# Patient Record
Sex: FEMALE | Race: BLACK OR AFRICAN AMERICAN
[De-identification: names, ages, dates, MRNs, and addresses within clinical notes are randomized per-mention and may not be internally consistent; named-entity substitution may affect disease eponyms.]

---

## 2020-08-20 LAB
ALBUMIN SERPL-MCNC: 3.3 G/DL (ref 3.5–5)
ALP SERPL-CCNC: 88 U/L (ref 38–126)
ANION GAP SERPL CALC-SCNC: 11 MMOL/L (ref 5–19)
AST SERPL-CCNC: 25 U/L (ref 14–36)
BILIRUB DIRECT SERPL-MCNC: 0.1 MG/DL (ref 0–0.4)
BILIRUB SERPL-MCNC: 0.7 MG/DL (ref 0.2–1.3)
BUN SERPL-MCNC: 9 MG/DL (ref 7–20)
CALCIUM: 9.2 MG/DL (ref 8.4–10.2)
CHLORIDE SERPL-SCNC: 98 MMOL/L (ref 98–107)
CO2 SERPL-SCNC: 27 MMOL/L (ref 22–30)
ERYTHROCYTE [DISTWIDTH] IN BLOOD BY AUTOMATED COUNT: 13.2 % (ref 11.5–14)
GLUCOSE SERPL-MCNC: 84 MG/DL (ref 75–110)
HCT VFR BLD CALC: 43.2 % (ref 36–47)
HGB BLD-MCNC: 14.8 G/DL (ref 12–15.5)
MCH RBC QN AUTO: 33.3 PG (ref 27–33.4)
MCHC RBC AUTO-ENTMCNC: 34.2 G/DL (ref 32–36)
MCV RBC AUTO: 98 FL (ref 80–97)
PLATELET # BLD: 482 10^3/UL (ref 150–450)
POTASSIUM SERPL-SCNC: 3.6 MMOL/L (ref 3.6–5)
PROT SERPL-MCNC: 6.7 G/DL (ref 6.3–8.2)
RBC # BLD AUTO: 4.43 10^6/UL (ref 3.72–5.28)
WBC # BLD AUTO: 10.6 10^3/UL (ref 4–10.5)

## 2020-08-20 NOTE — RADIOLOGY REPORT (SQ)
EXAM DESCRIPTION:  CHEST PA/LATERAL



IMAGES COMPLETED DATE/TIME:  8/20/2020 12:53 pm



REASON FOR STUDY:  COUGH



COMPARISON:  2/13/2015



EXAM PARAMETERS:  NUMBER OF VIEWS: two views

TECHNIQUE: Digital Frontal and Lateral radiographic views of the chest acquired.

RADIATION DOSE: NA

LIMITATIONS: none



FINDINGS:  LUNGS AND PLEURA: No opacities, masses or pneumothorax. No pleural effusion.

MEDIASTINUM AND HILAR STRUCTURES: Right paratracheal density not present on the prior, possibly artif
act from overlapping normal vessels.  Enlarged main pulmonary arterial segment unchanged.

HEART AND VASCULAR STRUCTURES: Heart normal size.  No evidence for failure.

BONES: No acute findings.

HARDWARE: None in the chest.

OTHER: No other significant finding.



IMPRESSION:  Possible right paratracheal adenopathy.  Consider CT follow-up.



TECHNICAL DOCUMENTATION:  JOB ID:  [id number]

 2011 [employer]- All Rights Reserved



Reading location - IP/workstation name: [email or web address]

## 2020-08-20 NOTE — EKG REPORT
SEVERITY:- ABNORMAL ECG -

SINUS RHYTHM

VENTRICULAR TRIGEMINY

LEFT AXIS DEVIATION

BORDERLINE R WAVE PROGRESSION, ANTERIOR LEADS

BORDERLINE T ABNORMALITIES, ANT-LAT LEADS

:

Confirmed by: Marino Del Rosario 20-Aug-2020 18:38:04

## 2020-08-26 ENCOUNTER — HOSPITAL ENCOUNTER (OUTPATIENT)
Dept: HOSPITAL 62 - OROUT | Age: 65
Discharge: HOME | End: 2020-08-26
Attending: SURGERY
Payer: MEDICARE

## 2020-08-26 VITALS — SYSTOLIC BLOOD PRESSURE: 91 MMHG | DIASTOLIC BLOOD PRESSURE: 61 MMHG

## 2020-08-26 DIAGNOSIS — I10: ICD-10-CM

## 2020-08-26 DIAGNOSIS — M19.90: ICD-10-CM

## 2020-08-26 DIAGNOSIS — J45.909: ICD-10-CM

## 2020-08-26 DIAGNOSIS — Z79.899: ICD-10-CM

## 2020-08-26 DIAGNOSIS — F17.210: ICD-10-CM

## 2020-08-26 DIAGNOSIS — Z79.82: ICD-10-CM

## 2020-08-26 DIAGNOSIS — Z01.818: ICD-10-CM

## 2020-08-26 DIAGNOSIS — K80.10: Primary | ICD-10-CM

## 2020-08-26 DIAGNOSIS — Z03.818: ICD-10-CM

## 2020-08-26 DIAGNOSIS — G47.33: ICD-10-CM

## 2020-08-26 PROCEDURE — 47562 LAPAROSCOPIC CHOLECYSTECTOMY: CPT

## 2020-08-26 PROCEDURE — 71046 X-RAY EXAM CHEST 2 VIEWS: CPT

## 2020-08-26 PROCEDURE — 88304 TISSUE EXAM BY PATHOLOGIST: CPT

## 2020-08-26 PROCEDURE — 36415 COLL VENOUS BLD VENIPUNCTURE: CPT

## 2020-08-26 PROCEDURE — 85027 COMPLETE CBC AUTOMATED: CPT

## 2020-08-26 PROCEDURE — C9803 HOPD COVID-19 SPEC COLLECT: HCPCS

## 2020-08-26 PROCEDURE — 87635 SARS-COV-2 COVID-19 AMP PRB: CPT

## 2020-08-26 PROCEDURE — 93010 ELECTROCARDIOGRAM REPORT: CPT

## 2020-08-26 PROCEDURE — 84132 ASSAY OF SERUM POTASSIUM: CPT

## 2020-08-26 PROCEDURE — 80053 COMPREHEN METABOLIC PANEL: CPT

## 2020-08-26 PROCEDURE — 93005 ELECTROCARDIOGRAM TRACING: CPT

## 2020-08-26 NOTE — OPERATIVE REPORT
Nonrecallable Operative Report


DATE OF SURGERY: 08/26/20


PREOPERATIVE DIAGNOSIS: Chronic cholecystitis


POSTOPERATIVE DIAGNOSIS: Same as above


OPERATION: Laparoscopic cholecystectomy


SURGEON: JEFF FRANCISCO


ANESTHESIA: GA


TISSUE REMOVED OR ALTERED: Gallbladder


COMPLICATIONS: 





None apparent


ESTIMATED BLOOD LOSS: 30 cc


PROCEDURE: 





Drains/implants: None.





Procedure in detail: After informed consent was obtained, the patient was 

brought to the operating room and laid in the supine position.  The area of the 

abdomen was prepped and draped in a normal sterile fashion.  A supraumbilical 

incision was created with a 15 blade scalpel.  Dissection was carried through 

the subcutaneous tissues using sharp and blunt dissection.  The cicatrix was 

identified, grasped with a Kocher clamp, and retracted upwards.  The linea alba 

fascia was incised sharply, the abdomen was entered sharply.  The balloon trocar

was inserted, and pneumoperitoneum was achieved.





A subxiphoid 5 mm port was then placed under direct laparoscopic visualization. 

2 more 5 mm ports were placed in the right upper quadrant in similar fashion.  

Atraumatic graspers were placed through the 5 mm ports.  The gallbladder was 

retracted cephalad and laterally.  Dissection was begun in the triangle of 

Calot.  There was a dense inflammatory reaction in and around the gallbladder, 

making dissection somewhat difficult.  The cystic duct and cystic artery were 

then fully visualized and skeletonized.  There were multiple branches of the 

cystic artery terminating at multiple levels of the gallbladder.  These were 

serially dissected, ligated, and then divided.  Once the critical view of safety

was obtained, the cystic duct was clipped and cut with laparoscopic instruments.

 The gallbladder was then removed from the liver using Bovie electrocautery.  

The gallbladder was placed into an Endo Catch bag, and removed through the 

supraumbilical trocar site.  The camera was reinserted.  The abdomen was then 

copiously irrigated and suctioned, until the effluent was clear.  The hilum was 

inspected.  It was found to be free of any leakage of blood or bile.  Once this 

was confirmed, the 5 mm trochars were removed under direct laparoscopic 

visualization.  The supraumbilical trocar was removed, and pneumoperitoneum was 

relieved.





The supraumbilical fascia was closed using 0 Vicryl suture in figure-of-eight 

fashion.  The overlying skin was closed using 4-0 Vicryl Rapide suture in 

subcuticular fashion.  Dressings were placed, and the procedure was concluded.  

All sponge, instrument, and needle counts were correct times 2.





Condition: Stable.

## 2020-08-26 NOTE — DISCHARGE SUMMARY
Discharge Summary (SDC)





- Discharge


Final Diagnosis: 





Chronic cholecystitis


Date of Surgery: 08/26/20


Discharge Date: 08/26/20


Condition: Stable


Forms:  [facility] Anesthesia D/C Instruction, Discharge POC-Surgical Service


Treatment or Instructions: 


Discharge home.  Diet as tolerated.  Activity: No lifting greater than 10 pounds

x 2 weeks.  Follow-up with [facility] in 7 to 10 days.  Okay to 

shower starting on Friday.  No baths or swimming pools x2 weeks.  Norco 10/325 

mg p.o. every 6 hours as needed for pain.


Prescriptions: 


Hydrocodone/Acetaminophen [Norco  mg Tablet] 1 tab PO Q6HP PRN #14 tablet


 PRN Reason: For Pain


Referrals: 


JEFF FRANCISCO MD [ACTIVE STAFF] - 09/09/20 8:15 am


Discharge Diet: As Tolerated


Respiratory Treatments at Home: Deep Breathing/Coughing, Incentive Spirometer


Discharge Activity: Balance Activity w/Rest, No Lifting Over 10 Pounds, No 

Lifting/Push/Pulling


Home Care Assistance: None Needed


Report the Following to Your Physician Immediately: Shortness of Breath, Nausea,

Vomiting, Increase in Pain, Yellow Skin, Fever over 101 Degrees, Unusual 

Bleeding, Redness, Swelling, Warmth

## 2020-10-12 ENCOUNTER — HOSPITAL ENCOUNTER (OUTPATIENT)
Dept: HOSPITAL 62 - RAD | Age: 65
End: 2020-10-12
Attending: FAMILY MEDICINE
Payer: MEDICARE

## 2020-10-12 DIAGNOSIS — R22.2: Primary | ICD-10-CM

## 2020-10-12 PROCEDURE — 71250 CT THORAX DX C-: CPT

## 2020-10-12 NOTE — RADIOLOGY REPORT (SQ)
EXAM DESCRIPTION:  CT CHEST WITHOUT



IMAGES COMPLETED DATE/TIME:  10/12/2020 1:44 pm



REASON FOR STUDY:  (R93.89)ABNORMAL FINDINGS ON DX IMAGING OF OTH BODY STRUCTURES R93.89  ABNORMAL FI
NDINGS ON DX IMAGING OF OTH BODY STRUCTURE



COMPARISON:  None.



TECHNIQUE:  CT scan performed of the chest without intravenous contrast.  Images reviewed with lung, 
soft tissue and bone windows.  Reconstructed coronal and sagittal MPR images reviewed.  All images st
ored on PACS.

All CT scanners at this facility use dose modulation, iterative reconstruction, and/or weight based d
osing when appropriate to reduce radiation dose to as low as reasonably achievable (ALARA).

[facility]: Dose Right  [facility]: CareDose    [facility]: Dose Right    [facility]: Teradose 4D    [facility]: Smart Eat In Chef



RADIATION DOSE:  CT Rad equipment meets quality standard of care and radiation dose reduction techniq
ues were employed. CTDIvol: 9.9 mGy. DLP: 358 mGy-cm. mGy.



LIMITATIONS:  No technical limitations.



FINDINGS:  LUNGS AND PLEURA: No masses, infiltrates, or pneumothorax.  No pleural effusions or pleura
l calcifications.

HILAR AND MEDIASTINAL STRUCTURES: There is a 4 to 5 cm thick upper mediastinal mass that extends from
 anterior to posterior along the right paratracheal region.  This is fairly homogeneous.

HEART AND VASCULAR STRUCTURES: No aneurysm.  No pericardial effusion.

UPPER ABDOMEN: No significant findings.  Limited exam.

THYROID AND OTHER SOFT TISSUES: No masses.  No adenopathy.

BONES: No significant finding.

HARDWARE: None in the chest.

OTHER: No other significant findings.



IMPRESSION:  Homogeneous upper mediastinal mass.  This could represent matted adenopathy.  Cannot exc
lude lymphoma.  Thymoma seems less likely.



TECHNICAL DOCUMENTATION:  JOB ID:  [id number]

Quality ID # [id number]: Final reports with documentation of one or more dose reduction techniques (e.g., Au
tomated exposure control, adjustment of the mA and/or kV according to patient size, use of iterative 
reconstruction technique)

 2011 [employer]- All Rights Reserved



Reading location - IP/workstation name: [email or web address]

## 2020-10-24 ENCOUNTER — HOSPITAL ENCOUNTER (OUTPATIENT)
Dept: HOSPITAL 62 - OD | Age: 65
End: 2020-10-24
Attending: INTERNAL MEDICINE
Payer: MEDICARE

## 2020-10-24 DIAGNOSIS — R91.8: Primary | ICD-10-CM

## 2020-10-24 LAB
ADD MANUAL DIFF: NO
APTT BLD: 34.3 SEC (ref 23.5–35.8)
BASOPHILS # BLD AUTO: 0 10^3/UL (ref 0–0.2)
BASOPHILS NFR BLD AUTO: 0.4 % (ref 0–2)
EOSINOPHIL # BLD AUTO: 0 10^3/UL (ref 0–0.6)
EOSINOPHIL NFR BLD AUTO: 0.4 % (ref 0–6)
ERYTHROCYTE [DISTWIDTH] IN BLOOD BY AUTOMATED COUNT: 13.2 % (ref 11.5–14)
HCT VFR BLD CALC: 40.9 % (ref 36–47)
HGB BLD-MCNC: 14.5 G/DL (ref 12–15.5)
INR PPP: 1.05
LYMPHOCYTES # BLD AUTO: 2.5 10^3/UL (ref 0.5–4.7)
LYMPHOCYTES NFR BLD AUTO: 33.2 % (ref 13–45)
MCH RBC QN AUTO: 34.4 PG (ref 27–33.4)
MCHC RBC AUTO-ENTMCNC: 35.5 G/DL (ref 32–36)
MCV RBC AUTO: 97 FL (ref 80–97)
MONOCYTES # BLD AUTO: 0.7 10^3/UL (ref 0.1–1.4)
MONOCYTES NFR BLD AUTO: 9.8 % (ref 3–13)
NEUTROPHILS # BLD AUTO: 4.2 10^3/UL (ref 1.7–8.2)
NEUTS SEG NFR BLD AUTO: 56.2 % (ref 42–78)
PLATELET # BLD: 383 10^3/UL (ref 150–450)
PROTHROMBIN TIME: 13.9 SEC (ref 11.4–15.4)
RBC # BLD AUTO: 4.22 10^6/UL (ref 3.72–5.28)
TOTAL CELLS COUNTED % (AUTO): 100 %
WBC # BLD AUTO: 7.5 10^3/UL (ref 4–10.5)

## 2020-10-24 PROCEDURE — 85610 PROTHROMBIN TIME: CPT

## 2020-10-24 PROCEDURE — 85730 THROMBOPLASTIN TIME PARTIAL: CPT

## 2020-10-24 PROCEDURE — 36415 COLL VENOUS BLD VENIPUNCTURE: CPT

## 2020-10-24 PROCEDURE — 85025 COMPLETE CBC W/AUTO DIFF WBC: CPT

## 2020-10-26 ENCOUNTER — HOSPITAL ENCOUNTER (OUTPATIENT)
Dept: HOSPITAL 62 - END | Age: 65
Discharge: HOME | End: 2020-10-26
Attending: INTERNAL MEDICINE
Payer: MEDICARE

## 2020-10-26 VITALS — SYSTOLIC BLOOD PRESSURE: 88 MMHG | DIASTOLIC BLOOD PRESSURE: 52 MMHG

## 2020-10-26 DIAGNOSIS — Z82.49: ICD-10-CM

## 2020-10-26 DIAGNOSIS — C7A.8: Primary | ICD-10-CM

## 2020-10-26 DIAGNOSIS — J45.909: ICD-10-CM

## 2020-10-26 DIAGNOSIS — F17.210: ICD-10-CM

## 2020-10-26 DIAGNOSIS — R91.8: ICD-10-CM

## 2020-10-26 DIAGNOSIS — Z79.899: ICD-10-CM

## 2020-10-26 DIAGNOSIS — Z79.82: ICD-10-CM

## 2020-10-26 DIAGNOSIS — G47.33: ICD-10-CM

## 2020-10-26 DIAGNOSIS — Z03.818: ICD-10-CM

## 2020-10-26 DIAGNOSIS — I10: ICD-10-CM

## 2020-10-26 DIAGNOSIS — I95.81: ICD-10-CM

## 2020-10-26 LAB
ADD MANUAL DIFF: NO
ANION GAP SERPL CALC-SCNC: 9 MMOL/L (ref 5–19)
APTT BLD: 30.9 SEC (ref 23.5–35.8)
BASOPHILS # BLD AUTO: 0 10^3/UL (ref 0–0.2)
BASOPHILS NFR BLD AUTO: 0.4 % (ref 0–2)
BUN SERPL-MCNC: 10 MG/DL (ref 7–20)
CALCIUM: 8.9 MG/DL (ref 8.4–10.2)
CHLORIDE SERPL-SCNC: 91 MMOL/L (ref 98–107)
CO2 SERPL-SCNC: 31 MMOL/L (ref 22–30)
EOSINOPHIL # BLD AUTO: 0.1 10^3/UL (ref 0–0.6)
EOSINOPHIL NFR BLD AUTO: 0.7 % (ref 0–6)
ERYTHROCYTE [DISTWIDTH] IN BLOOD BY AUTOMATED COUNT: 12.9 % (ref 11.5–14)
GLUCOSE SERPL-MCNC: 103 MG/DL (ref 75–110)
HCT VFR BLD CALC: 41.8 % (ref 36–47)
HGB BLD-MCNC: 14.9 G/DL (ref 12–15.5)
INR PPP: 1
LYMPHOCYTES # BLD AUTO: 3 10^3/UL (ref 0.5–4.7)
LYMPHOCYTES NFR BLD AUTO: 37 % (ref 13–45)
MCH RBC QN AUTO: 34.2 PG (ref 27–33.4)
MCHC RBC AUTO-ENTMCNC: 35.6 G/DL (ref 32–36)
MCV RBC AUTO: 96 FL (ref 80–97)
MONOCYTES # BLD AUTO: 0.8 10^3/UL (ref 0.1–1.4)
MONOCYTES NFR BLD AUTO: 9.6 % (ref 3–13)
NEUTROPHILS # BLD AUTO: 4.2 10^3/UL (ref 1.7–8.2)
NEUTS SEG NFR BLD AUTO: 52.3 % (ref 42–78)
PLATELET # BLD: 375 10^3/UL (ref 150–450)
POTASSIUM SERPL-SCNC: 3.1 MMOL/L (ref 3.6–5)
PROTHROMBIN TIME: 13.4 SEC (ref 11.4–15.4)
RBC # BLD AUTO: 4.36 10^6/UL (ref 3.72–5.28)
TOTAL CELLS COUNTED % (AUTO): 100 %
WBC # BLD AUTO: 8 10^3/UL (ref 4–10.5)

## 2020-10-26 PROCEDURE — C9803 HOPD COVID-19 SPEC COLLECT: HCPCS

## 2020-10-26 PROCEDURE — 00520 ANES CLOSED CHEST PX NOS: CPT

## 2020-10-26 PROCEDURE — 85730 THROMBOPLASTIN TIME PARTIAL: CPT

## 2020-10-26 PROCEDURE — 31623 DX BRONCHOSCOPE/BRUSH: CPT

## 2020-10-26 PROCEDURE — 31625 BRONCHOSCOPY W/BIOPSY(S): CPT

## 2020-10-26 PROCEDURE — 88104 CYTOPATH FL NONGYN SMEARS: CPT

## 2020-10-26 PROCEDURE — 88342 IMHCHEM/IMCYTCHM 1ST ANTB: CPT

## 2020-10-26 PROCEDURE — 85025 COMPLETE CBC W/AUTO DIFF WBC: CPT

## 2020-10-26 PROCEDURE — 87635 SARS-COV-2 COVID-19 AMP PRB: CPT

## 2020-10-26 PROCEDURE — 80048 BASIC METABOLIC PNL TOTAL CA: CPT

## 2020-10-26 PROCEDURE — 87070 CULTURE OTHR SPECIMN AEROBIC: CPT

## 2020-10-26 PROCEDURE — 88305 TISSUE EXAM BY PATHOLOGIST: CPT

## 2020-10-26 PROCEDURE — 31624 DX BRONCHOSCOPE/LAVAGE: CPT

## 2020-10-26 PROCEDURE — 71045 X-RAY EXAM CHEST 1 VIEW: CPT

## 2020-10-26 PROCEDURE — 85610 PROTHROMBIN TIME: CPT

## 2020-10-26 PROCEDURE — 88162 CYTOPATH SMEAR OTHER SOURCE: CPT

## 2020-10-26 PROCEDURE — 36415 COLL VENOUS BLD VENIPUNCTURE: CPT

## 2020-10-26 PROCEDURE — 88341 IMHCHEM/IMCYTCHM EA ADD ANTB: CPT

## 2020-10-26 PROCEDURE — 87205 SMEAR GRAM STAIN: CPT

## 2020-10-26 RX ADMIN — PHENYLEPHRINE HYDROCHLORIDE ONE MG: 10 INJECTION INTRAVENOUS at 14:47

## 2020-10-26 RX ADMIN — PHENYLEPHRINE HYDROCHLORIDE ONE MG: 10 INJECTION INTRAVENOUS at 14:22

## 2020-10-26 RX ADMIN — PHENYLEPHRINE HYDROCHLORIDE ONE MG: 10 INJECTION INTRAVENOUS at 15:30

## 2020-10-26 NOTE — OPERATIVE REPORT
Operative Report


DATE OF SURGERY: 10/26/20


PREOPERATIVE DIAGNOSIS: Large right upper lobe mass.


POSTOPERATIVE DIAGNOSIS: Markedly irregular tracheal mucosal surfaces with 

hemorrhage.  Mid trachea to distal trachea was narrowed with extrinsic 

compression.  Orifice to the right upper lobe was markedly narrowed.  No obvious

endobronchial tumor was noted in the right upper lobe.  Bronchus intermedius was

markedly narrowed.  Right middle lobe and right lower lobe bronchi were mildly 

narrowed without obvious endobronchial tumor.  Multiple photographs were taken


OPERATION: Fiberoptic bronchoscopy with brushings washings and endobronchial 

biopsies.


SURGEON: HENRY DAVID BRUTON


ANESTHESIA: Moderate Sedation


TISSUE REMOVED OR ALTERED: Multiple endobronchial biopsies.


COMPLICATIONS: 





None


ESTIMATED BLOOD LOSS: Less than 5 cc


INTRAOPERATIVE FINDINGS: Grossly abnormal and hemorrhagic mucosa involving the 

mid to lower trachea.  The right upper lobe and bronchus intermedius mucosal 

surfaces were likewise markedly abnormal.  There was extrinsic compression of 

the mid to distal trachea as well as the right upper lobe bronchus.


PROCEDURE: 





Informed consent was obtained.  Patient was prepped and draped in the usual 

fashion.  Anesthesia was per anesthesiology.  Bronchoscope was introduced via th

e right nares without difficulty.  Vocal cords were visualized and were normal. 

Both moved symmetrically.  Proximal trachea was visualized and appeared grossly 

normal beginning at the mid trachea there was extrinsic compression with 

markedly abnormal mucosa.  The mucosa appeared to be studded with endobronchial 

tumor.  Tissue was very friable with bleeding noted.  Attention was then focused

to the left mainstem bronchus where the left mainstem left upper lobe left lower

lobe bronchi as well as her segments and subsegments were visualized.  They 

appeared to be grossly normal.  No endobronchial tumor was appreciated.  

Attention was then focused to the right where there appeared to be a right mid 

trachea tumor.  This was small.  The right upper lobe orifice was also 

appreciated.  It was markedly narrowed.  Mucosal surfaces were markedly abnormal

and friable.  The anterior apical and posterior subsegments of the right upper 

lobe were visualized and although they were narrowed there was no obvious 

endobronchial tumor.  The bronchus intermedius revealed friable mucosa.  It was 

narrowed as well.  The right middle lobe and right lower lobe bronchi as well as

their segments and subsegments were visualized.  They appear to be grossly 

normal.  Attention was then refocused to the mid trachea where a nodular tumor 

was appreciated.  Multiple brushings and biopsies were taken of this area.  

Multiple brushings washings and biopsies were taken of the distal trachea as 

well.  Patient tolerated procedure well.  Specimens were sent for path analysis.

 Was transported to recovery in good condition.

## 2020-10-26 NOTE — RADIOLOGY REPORT (SQ)
EXAM DESCRIPTION:  CHEST SINGLE VIEW



IMAGES COMPLETED DATE/TIME:  10/26/2020 4:42 pm



REASON FOR STUDY:  POST BRONCH



COMPARISON:  8/20/2020



EXAM PARAMETERS:  NUMBER OF VIEWS: One view.

TECHNIQUE: Single frontal radiographic view of the chest acquired.

RADIATION DOSE: NA

LIMITATIONS: None.



FINDINGS:  LUNGS AND PLEURA:  Status post bronchoscopy, no evidence of pneumothorax.  No acute pulmon
elisabeth consolidation.  No pleural effusion.

MEDIASTINUM AND HILAR STRUCTURES:  Since the previous examination, interval progression and increase 
in size of the right paratracheal mass (please see CT chest report 10/12/2020).

HEART AND VASCULAR STRUCTURES: Heart normal in size.  Normal vasculature.

BONES: No acute findings.

HARDWARE: None in the chest.

OTHER: No other significant finding.



IMPRESSION:  1.  Status post bronchoscopy, no evidence of pneumothorax.

2.  Interval increase in size of the right paratracheal mass since the previous examination dated 8/2
0/2020.  Please see CT chest report.



TECHNICAL DOCUMENTATION:  JOB ID:  [id number]

 2011 [employer]- All Rights Reserved



Reading location - IP/workstation name: [email or web address]

## 2020-10-28 NOTE — PDOC DISCHARGE SUMMARY
Impression





- Admit/DC Date/PCP


Admission Date/Primary Care Provider: 


  





  RITA DIEHL





Discharge Date: 10/28/20





- Assessment


Summary: 


Patient was admitted to day surgery for fiberoptic bronchoscopy.  This was 

performed without complication.  Patient underwent normal recovery in the day 

surgery area.  She was noted to be mildly hypotensive but able to stand and walk

without difficulty.  She was dismissed home to continue her home medications and

follow-up in 2 days for bronchoscopy results.





- Additional Information


Discharge Diet: As Tolerated


Discharge Activity: Activity As Tolerated


Referrals: 


BRUTON,HENRY DAVID, MD [ACTIVE PROVISIONAL STAFF] - 


RITA DIEHL MD [Primary Care Provider] - 


Home Medications: 








Hydrochlorothiazide 25 mg PO DAILY 01/13/15 


Albuterol Sulfate [Albuterol Sulfate Hfa] 1 - 2 puff IH Q4 06/03/15 


Aspirin 81 mg PO DAILY 08/26/20 











History of Present Illiness


History of Present Illness: 


KRISTIN YUN is a 65 year old female








Physical Exam


Vital Signs: 


                                        











Temp Pulse Resp BP Pulse Ox


 


 98.0 F   98   14   88/52 L  100 


 


 10/26/20 17:00  10/26/20 17:00  10/26/20 17:00  10/26/20 17:00  10/26/20 17:00








                                 Intake & Output











 10/27/20 10/28/20 10/29/20





 06:59 06:59 06:59


 


Intake Total 1522  


 


Balance 1522  


 


Weight 86.8 kg  














Results


Laboratory Results: 


                                        











WBC  8.0 10^3/uL (4.0-10.5)   10/26/20  11:01    


 


RBC  4.36 10^6/uL (3.72-5.28)   10/26/20  11:01    


 


Hgb  14.9 g/dL (12.0-15.5)   10/26/20  11:01    


 


Hct  41.8 % (36.0-47.0)   10/26/20  11:01    


 


MCV  96 fl (80-97)   10/26/20  11:01    


 


MCH  34.2 pg (27.0-33.4)  H  10/26/20  11:01    


 


MCHC  35.6 g/dL (32.0-36.0)   10/26/20  11:01    


 


RDW  12.9 % (11.5-14.0)   10/26/20  11:01    


 


Plt Count  375 10^3/uL (150-450)   10/26/20  11:01    


 


Lymph % (Auto)  37.0 % (13-45)   10/26/20  11:01    


 


Mono % (Auto)  9.6 % (3-13)   10/26/20  11:01    


 


Eos % (Auto)  0.7 % (0-6)   10/26/20  11:01    


 


Baso % (Auto)  0.4 % (0-2)   10/26/20  11:01    


 


Absolute Neuts (auto)  4.2 10^3/uL (1.7-8.2)   10/26/20  11:01    


 


Absolute Lymphs (auto)  3.0 10^3/uL (0.5-4.7)   10/26/20  11:01    


 


Absolute Monos (auto)  0.8 10^3/uL (0.1-1.4)   10/26/20  11:01    


 


Absolute Eos (auto)  0.1 10^3/uL (0.0-0.6)   10/26/20  11:01    


 


Absolute Basos (auto)  0.0 10^3/uL (0.0-0.2)   10/26/20  11:01    


 


Seg Neutrophils %  52.3 % (42-78)   10/26/20  11:01    


 


PT  13.4 SEC (11.4-15.4)   10/26/20  11:01    


 


INR  1.00   10/26/20  11:01    


 


APTT  30.9 SEC (23.5-35.8)   10/26/20  11:01    


 


Sodium  130.7 mmol/L (137-145)  L  10/26/20  11:01    


 


Potassium  3.1 mmol/L (3.6-5.0)  L  10/26/20  11:01    


 


Chloride  91 mmol/L ()  L  10/26/20  11:01    


 


Carbon Dioxide  31 mmol/L (22-30)  H  10/26/20  11:01    


 


Anion Gap  9  (5-19)   10/26/20  11:01    


 


BUN  10 mg/dL (7-20)   10/26/20  11:01    


 


Creatinine  0.68 mg/dL (0.52-1.25)   10/26/20  11:01    


 


Est GFR ( Amer)  > 60  (>60)   10/26/20  11:01    


 


Est GFR (MDRD) Non-Af  > 60  (>60)   10/26/20  11:01    


 


Glucose  103 mg/dL ()   10/26/20  11:01    


 


Calcium  8.9 mg/dL (8.4-10.2)   10/26/20  11:01    


 


COVID-19 Source  See comment   10/24/20  11:40    


 


COVID-19 ([facility])  Not Detected  (Not Detect)   10/24/20  11:40    











Impressions: 


                                        





Chest X-Ray  10/26/20 00:00


IMPRESSION:  1.  Status post bronchoscopy, no evidence of pneumothorax.


2.  Interval increase in size of the right paratracheal mass since the previous 

examination dated 8/20/2020.  Please see CT chest report.


 














Stroke


Is this a Stroke Patient?: No





Acute Heart Failure


Is this a Heart Failure Patient?: No